# Patient Record
Sex: FEMALE | Race: WHITE | NOT HISPANIC OR LATINO | Employment: UNEMPLOYED | RURAL
[De-identification: names, ages, dates, MRNs, and addresses within clinical notes are randomized per-mention and may not be internally consistent; named-entity substitution may affect disease eponyms.]

---

## 2018-09-04 ENCOUNTER — HOSPITAL ENCOUNTER (OUTPATIENT)
Dept: PHYSICAL THERAPY | Facility: HOSPITAL | Age: 58
Setting detail: RECURRING SERIES
Discharge: HOME | End: 2018-12-31
Attending: PHYSICIAN ASSISTANT | Admitting: PHYSICIAN ASSISTANT

## 2018-12-05 ENCOUNTER — HOSPITAL ENCOUNTER (OUTPATIENT)
Dept: MRI IMAGING | Facility: HOSPITAL | Age: 58
Discharge: HOME | End: 2018-12-05
Attending: PHYSICIAN ASSISTANT | Admitting: PHYSICIAN ASSISTANT

## 2019-09-11 ENCOUNTER — TRANSCRIBE ORDERS (OUTPATIENT)
Dept: ADMINISTRATIVE | Facility: HOSPITAL | Age: 59
End: 2019-09-11

## 2019-09-11 DIAGNOSIS — M51.06 INTERVERTEBRAL LUMBAR DISC DISORDER WITH MYELOPATHY, LUMBAR REGION: Primary | ICD-10-CM

## 2019-09-17 ENCOUNTER — APPOINTMENT (OUTPATIENT)
Dept: MRI IMAGING | Facility: HOSPITAL | Age: 59
End: 2019-09-17

## 2019-09-19 ENCOUNTER — OFFICE VISIT (OUTPATIENT)
Dept: ORTHOPEDIC SURGERY | Facility: CLINIC | Age: 59
End: 2019-09-19

## 2019-09-19 VITALS
WEIGHT: 225 LBS | SYSTOLIC BLOOD PRESSURE: 132 MMHG | BODY MASS INDEX: 37.49 KG/M2 | HEART RATE: 72 BPM | HEIGHT: 65 IN | DIASTOLIC BLOOD PRESSURE: 76 MMHG

## 2019-09-19 DIAGNOSIS — M47.26 OTHER SPONDYLOSIS WITH RADICULOPATHY, LUMBAR REGION: Primary | ICD-10-CM

## 2019-09-19 DIAGNOSIS — M43.16 SPONDYLOLISTHESIS OF LUMBAR REGION: ICD-10-CM

## 2019-09-19 PROCEDURE — 99213 OFFICE O/P EST LOW 20 MIN: CPT | Performed by: PHYSICIAN ASSISTANT

## 2019-09-19 RX ORDER — GABAPENTIN 300 MG/1
CAPSULE ORAL
Refills: 3 | COMMUNITY
Start: 2019-09-04

## 2019-09-19 RX ORDER — AMLODIPINE BESYLATE 10 MG/1
10 TABLET ORAL DAILY
Refills: 0 | COMMUNITY
Start: 2019-06-17

## 2019-09-19 RX ORDER — CYCLOBENZAPRINE HCL 10 MG
10 TABLET ORAL EVERY 8 HOURS PRN
Refills: 3 | COMMUNITY
Start: 2019-09-04

## 2019-09-19 RX ORDER — PRAVASTATIN SODIUM 20 MG
20 TABLET ORAL
Refills: 2 | COMMUNITY
Start: 2019-07-27

## 2019-09-19 RX ORDER — ERGOCALCIFEROL 1.25 MG/1
50000 CAPSULE ORAL WEEKLY
Refills: 0 | COMMUNITY
Start: 2019-07-22

## 2019-09-19 RX ORDER — IBUPROFEN 800 MG/1
TABLET ORAL
Refills: 3 | COMMUNITY
Start: 2019-09-04

## 2019-09-19 NOTE — PATIENT INSTRUCTIONS
Back Exercises  The following exercises strengthen the muscles that help to support the back. They also help to keep the lower back flexible. Doing these exercises can help to prevent back pain or lessen existing pain.  If you have back pain or discomfort, try doing these exercises 2-3 times each day or as told by your health care provider. When the pain goes away, do them once each day, but increase the number of times that you repeat the steps for each exercise (do more repetitions). If you do not have back pain or discomfort, do these exercises once each day or as told by your health care provider.  Exercises  Single Knee to Chest  Repeat these steps 3-5 times for each le. Lie on your back on a firm bed or the floor with your legs extended.  2. Bring one knee to your chest. Your other leg should stay extended and in contact with the floor.  3. Hold your knee in place by grabbing your knee or thigh.  4. Pull on your knee until you feel a gentle stretch in your lower back.  5. Hold the stretch for 10-30 seconds.  6. Slowly release and straighten your leg.  Pelvic Tilt  Repeat these steps 5-10 times:  1. Lie on your back on a firm bed or the floor with your legs extended.  2. Bend your knees so they are pointing toward the ceiling and your feet are flat on the floor.  3. Tighten your lower abdominal muscles to press your lower back against the floor. This motion will tilt your pelvis so your tailbone points up toward the ceiling instead of pointing to your feet or the floor.  4. With gentle tension and even breathing, hold this position for 5-10 seconds.  Cat-Cow  Repeat these steps until your lower back becomes more flexible:  1. Get into a hands-and-knees position on a firm surface. Keep your hands under your shoulders, and keep your knees under your hips. You may place padding under your knees for comfort.  2. Let your head hang down, and point your tailbone toward the floor so your lower back becomes  rounded like the back of a cat.  3. Hold this position for 5 seconds.  4. Slowly lift your head and point your tailbone up toward the ceiling so your back forms a sagging arch like the back of a cow.  5. Hold this position for 5 seconds.    Press-Ups  Repeat these steps 5-10 times:  1. Lie on your abdomen (face-down) on the floor.  2. Place your palms near your head, about shoulder-width apart.  3. While you keep your back as relaxed as possible and keep your hips on the floor, slowly straighten your arms to raise the top half of your body and lift your shoulders. Do not use your back muscles to raise your upper torso. You may adjust the placement of your hands to make yourself more comfortable.  4. Hold this position for 5 seconds while you keep your back relaxed.  5. Slowly return to lying flat on the floor.    Bridges  Repeat these steps 10 times:  1. Lie on your back on a firm surface.  2. Bend your knees so they are pointing toward the ceiling and your feet are flat on the floor.  3. Tighten your buttocks muscles and lift your buttocks off of the floor until your waist is at almost the same height as your knees. You should feel the muscles working in your buttocks and the back of your thighs. If you do not feel these muscles, slide your feet 1-2 inches farther away from your buttocks.  4. Hold this position for 3-5 seconds.  5. Slowly lower your hips to the starting position, and allow your buttocks muscles to relax completely.  If this exercise is too easy, try doing it with your arms crossed over your chest.  Abdominal Crunches  Repeat these steps 5-10 times:  1. Lie on your back on a firm bed or the floor with your legs extended.  2. Bend your knees so they are pointing toward the ceiling and your feet are flat on the floor.  3. Cross your arms over your chest.  4. Tip your chin slightly toward your chest without bending your neck.  5. Tighten your abdominal muscles and slowly raise your trunk (torso) high  enough to lift your shoulder blades a tiny bit off of the floor. Avoid raising your torso higher than that, because it can put too much stress on your low back and it does not help to strengthen your abdominal muscles.  6. Slowly return to your starting position.  Back Lifts  Repeat these steps 5-10 times:  1. Lie on your abdomen (face-down) with your arms at your sides, and rest your forehead on the floor.  2. Tighten the muscles in your legs and your buttocks.  3. Slowly lift your chest off of the floor while you keep your hips pressed to the floor. Keep the back of your head in line with the curve in your back. Your eyes should be looking at the floor.  4. Hold this position for 3-5 seconds.  5. Slowly return to your starting position.  Contact a health care provider if:  · Your back pain or discomfort gets much worse when you do an exercise.  · Your back pain or discomfort does not lessen within 2 hours after you exercise.  If you have any of these problems, stop doing these exercises right away. Do not do them again unless your health care provider says that you can.  Get help right away if:  · You develop sudden, severe back pain. If this happens, stop doing the exercises right away. Do not do them again unless your health care provider says that you can.  This information is not intended to replace advice given to you by your health care provider. Make sure you discuss any questions you have with your health care provider.  Document Released: 01/25/2006 Document Revised: 07/31/2018 Document Reviewed: 02/11/2016  [employer] Interactive Patient Education © 2019 [employer]      Neck Exercises  Neck exercises can be important for many reasons:  · They can help you to improve and maintain flexibility in your neck. This can be especially important as you age.  · They can help to make your neck stronger. This can make movement easier.  · They can reduce or prevent neck pain.  · They may help your upper back.  Ask  your health care provider which neck exercises would be best for you.  Exercises to improve neck flexibility  Neck stretch  Repeat this exercise 3-5 times.  5. Do this exercise while standing or while sitting in a chair.  6. Place your feet flat on the floor, shoulder-width apart.  7. Slowly turn your head to the right. Turn it all the way to the right so you can look over your right shoulder. Do not tilt or tip your head.  8. Hold this position for 10-30 seconds.  9. Slowly turn your head to the left, to look over your left shoulder.  10. Hold this position for 10-30 seconds.    Neck retraction  Repeat this exercise 8-10 times. Do this 3-4 times a day or as told by your health care provider.  6. Do this exercise while standing or while sitting in a sturdy chair.  7. Look straight ahead. Do not bend your neck.  8. Use your fingers to push your chin backward. Do not bend your neck for this movement. Continue to face straight ahead. If you are doing the exercise properly, you will feel a slight sensation in your throat and a stretch at the back of your neck.  9. Hold the stretch for 1-2 seconds. Relax and repeat.  Exercises to improve neck strength  Neck press  Repeat this exercise 10 times. Do it first thing in the morning and right before bed or as told by your health care provider.  6. Lie on your back on a firm bed or on the floor with a pillow under your head.  7. Use your neck muscles to push your head down on the pillow and straighten your spine.  8. Hold the position as well as you can. Keep your head facing up and your chin tucked.  9. Slowly count to 5 while holding this position.  10. Relax for a few seconds. Then repeat.  Isometric strengthening  Do a full set of these exercises 2 times a day or as told by your health care provider.  6. Sit in a supportive chair and place your hand on your forehead.  7. Push forward with your head and neck while pushing back with your hand. Hold for 10 seconds.  8. Relax.  Then repeat the exercise 3 times.  9. Next, do the sequence again, this time putting your hand against the back of your head. Use your head and neck to push backward against the hand pressure.  10. Finally, do the same exercise on either side of your head, pushing sideways against the pressure of your hand.  Prone head lifts  Repeat this exercise 5 times. Do this 2 times a day or as told by your health care provider.  7. Lie face-down, resting on your elbows so that your chest and upper back are raised.  8. Start with your head facing downward, near your chest. Position your chin either on or near your chest.  9. Slowly lift your head upward. Lift until you are looking straight ahead. Then continue lifting your head as far back as you can stretch.  10. Hold your head up for 5 seconds. Then slowly lower it to your starting position.  Supine head lifts  Repeat this exercise 8-10 times. Do this 2 times a day or as told by your health care provider.  6. Lie on your back, bending your knees to point to the ceiling and keeping your feet flat on the floor.  7. Lift your head slowly off the floor, raising your chin toward your chest.  8. Hold for 5 seconds.  9. Relax and repeat.  Scapular retraction  Repeat this exercise 5 times. Do this 2 times a day or as told by your health care provider.  1. Stand with your arms at your sides. Look straight ahead.  2. Slowly pull both shoulders backward and downward until you feel a stretch between your shoulder blades in your upper back.  3. Hold for 10-30 seconds.  4. Relax and repeat.  Contact a health care provider if:  · Your neck pain or discomfort gets much worse when you do an exercise.  · Your neck pain or discomfort does not improve within 2 hours after you exercise.  If you have any of these problems, stop exercising right away. Do not do the exercises again unless your health care provider says that you can.  Get help right away if:  · You develop sudden, severe neck pain.  If this happens, stop exercising right away. Do not do the exercises again unless your health care provider says that you can.  This information is not intended to replace advice given to you by your health care provider. Make sure you discuss any questions you have with your health care provider.  Document Released: 11/28/2016 Document Revised: 04/23/2019 Document Reviewed: 06/27/2016  [employer] Interactive Patient Education © 2019 [employer]      Neck Exercises  Neck exercises can be important for many reasons:  · They can help you to improve and maintain flexibility in your neck. This can be especially important as you age.  · They can help to make your neck stronger. This can make movement easier.  · They can reduce or prevent neck pain.  · They may help your upper back.  Ask your health care provider which neck exercises would be best for you.  Exercises to improve neck flexibility  Neck stretch  Repeat this exercise 3-5 times.  11. Do this exercise while standing or while sitting in a chair.  12. Place your feet flat on the floor, shoulder-width apart.  13. Slowly turn your head to the right. Turn it all the way to the right so you can look over your right shoulder. Do not tilt or tip your head.  14. Hold this position for 10-30 seconds.  15. Slowly turn your head to the left, to look over your left shoulder.  16. Hold this position for 10-30 seconds.    Neck retraction  Repeat this exercise 8-10 times. Do this 3-4 times a day or as told by your health care provider.  10. Do this exercise while standing or while sitting in a sturdy chair.  11. Look straight ahead. Do not bend your neck.  12. Use your fingers to push your chin backward. Do not bend your neck for this movement. Continue to face straight ahead. If you are doing the exercise properly, you will feel a slight sensation in your throat and a stretch at the back of your neck.  13. Hold the stretch for 1-2 seconds. Relax and repeat.  Exercises to  improve neck strength  Neck press  Repeat this exercise 10 times. Do it first thing in the morning and right before bed or as told by your health care provider.  11. Lie on your back on a firm bed or on the floor with a pillow under your head.  12. Use your neck muscles to push your head down on the pillow and straighten your spine.  13. Hold the position as well as you can. Keep your head facing up and your chin tucked.  14. Slowly count to 5 while holding this position.  15. Relax for a few seconds. Then repeat.  Isometric strengthening  Do a full set of these exercises 2 times a day or as told by your health care provider.  11. Sit in a supportive chair and place your hand on your forehead.  12. Push forward with your head and neck while pushing back with your hand. Hold for 10 seconds.  13. Relax. Then repeat the exercise 3 times.  14. Next, do the sequence again, this time putting your hand against the back of your head. Use your head and neck to push backward against the hand pressure.  15. Finally, do the same exercise on either side of your head, pushing sideways against the pressure of your hand.  Prone head lifts  Repeat this exercise 5 times. Do this 2 times a day or as told by your health care provider.  11. Lie face-down, resting on your elbows so that your chest and upper back are raised.  12. Start with your head facing downward, near your chest. Position your chin either on or near your chest.  13. Slowly lift your head upward. Lift until you are looking straight ahead. Then continue lifting your head as far back as you can stretch.  14. Hold your head up for 5 seconds. Then slowly lower it to your starting position.  Supine head lifts  Repeat this exercise 8-10 times. Do this 2 times a day or as told by your health care provider.  10. Lie on your back, bending your knees to point to the ceiling and keeping your feet flat on the floor.  11. Lift your head slowly off the floor, raising your chin  toward your chest.  12. Hold for 5 seconds.  13. Relax and repeat.  Scapular retraction  Repeat this exercise 5 times. Do this 2 times a day or as told by your health care provider.  5. Stand with your arms at your sides. Look straight ahead.  6. Slowly pull both shoulders backward and downward until you feel a stretch between your shoulder blades in your upper back.  7. Hold for 10-30 seconds.  8. Relax and repeat.  Contact a health care provider if:  · Your neck pain or discomfort gets much worse when you do an exercise.  · Your neck pain or discomfort does not improve within 2 hours after you exercise.  If you have any of these problems, stop exercising right away. Do not do the exercises again unless your health care provider says that you can.  Get help right away if:  · You develop sudden, severe neck pain. If this happens, stop exercising right away. Do not do the exercises again unless your health care provider says that you can.  This information is not intended to replace advice given to you by your health care provider. Make sure you discuss any questions you have with your health care provider.  Document Released: 11/28/2016 Document Revised: 04/23/2019 Document Reviewed: 06/27/2016  [employer] Interactive Patient Education © 2019 [employer]

## 2019-09-19 NOTE — PROGRESS NOTES
Orthopedic Spine Follow Up    Referring Provider: No ref. provider found  Primary Care Provider: Lars Mendzoa APRN    Patient Name: Galilea Chapman  Patient Age: 59 y.o.  Chief Complaint: had concerns including Pain of the Left Leg.    History of Present Illness: Galilea Chapman is a 59 y.o. year old female here in  Follow up today with chief complaint of had concerns including Pain of the Left Leg..has known cervicla and lumbar ddd. She has had some improvement in her neck with injecitons, but over the last 2 years has had rle pain/numbness and weakness. Also has axial back pain. Pain changes based on activity or weather change. The numbness improlved with lumbar traction but her pain did not. Takes gabapentin, advil, flexaril withsome improvemtn.    Imaging:  My interpretation of AP and lateral lumbar x-rays continues to demonstrate significant disc height loss with degenerative listhesis of L5-S1.  Listhesis is stable on flexion extension.    Assessment:   1. Other spondylosis with radiculopathy, lumbar region    2. Spondylolisthesis of lumbar region        Plan:  L5-S1 epidural injection.  Also given her a series of isometric exercises to perform.  She fails to improve she would be a surgical candidate but would need an MRI prior to surgery.  Follow-up after epidural injection.    Subjective     Review of Systems   Constitutional: Positive for activity change. Negative for fatigue and fever.   HENT: Negative for sore throat.    Eyes: Negative for double vision.   Respiratory: Negative for choking.    Gastrointestinal: Negative for abdominal pain and constipation.   Genitourinary: Negative for dysuria.   Musculoskeletal: Positive for back pain and gait problem.   Skin: Negative for rash.   Neurological: Negative for numbness.   Hematological: Does not bruise/bleed easily.   Psychiatric/Behavioral: Positive for sleep disturbance.       The following portions of the patient's history were reviewed and updated  as appropriate: allergies, current medications, past medical history, past surgical history and problem list.    Objective     Physical Exam   Constitutional: She is oriented to person, place, and time. She appears well-developed.   HENT:   Head: Normocephalic and atraumatic.   Eyes: Conjunctivae are normal.   Neck: Normal range of motion.   Cardiovascular: Normal rate.   Pulmonary/Chest: Effort normal.   Abdominal: There is no guarding.   Musculoskeletal: She exhibits tenderness.        Right hip: Normal.        Left hip: Normal.        Lumbar back: She exhibits decreased range of motion, tenderness and pain.   Neurological: She is oriented to person, place, and time. She has normal strength.   Positive straight leg raise bilaterally   Skin: Skin is warm.   Psychiatric: Her behavior is normal.   Vitals reviewed.    Ortho Exam      1. Other spondylosis with radiculopathy, lumbar region    2. Spondylolisthesis of lumbar region         Orders Placed This Encounter   Procedures   • XR Spine Lumbar AP & Lateral With Flex & Ext     Order Specific Question:   Reason for Exam:     Answer:   back pain with radiculopathy   • Ambulatory Referral to Pain Management     Referral Priority:   Routine     Referral Type:   Pain Management     Referral Reason:   Specialty Services Required     Referred to Provider:   Binh Chawla MD     Requested Specialty:   Pain Medicine     Number of Visits Requested:   1       Procedures

## 2019-10-14 ENCOUNTER — TELEPHONE (OUTPATIENT)
Dept: ORTHOPEDIC SURGERY | Facility: CLINIC | Age: 59
End: 2019-10-14

## 2019-10-23 NOTE — TELEPHONE ENCOUNTER
To whom it may concern,    I first had an opportunity to meet and begin treating Galilea Chapman on August 21, 2018.  At that point she was having neck pain, low back pain, left shoulder pain and some numbness and tingling of her left hand which began after a fall at [facility] on July 3, 2018.  At that point she had already been to the emergency department on July 5 where an x-ray of her neck, her low back, her left shoulder, and her pelvis were performed.  She was subsequently referred to me.  Her lumbar x-ray was consistent with lumbar disc degeneration as well as her cervical x-ray.  These findings are not atypical for somebody of her age.  Furthermore these findings are frequently not symptomatic.    Patient had told me prior to the fall at [facility] she had was asymptomatic without any neck back or arm pain.  She was sent for some physical therapy and given some medication and return to the office on 9/26/2018.  At that point her symptoms have improved and we continued that treatment with a follow-up on 11/27/2018.  At that point time her low back issues had improved dramatically but her neck pain and arm symptoms had worsened so she was sent for an MRI of her neck and a test called an EMG/nerve conduction test.    She returns the office on 12/27/2018 to review the results of those tests.  Her MRI was consistent with disc degeneration at 2 levels in her neck and a left-sided disc herniation causing some left-sided spinal stenosis or narrowing of the small openings that the nerves which give Feeling into her arm.  Her EMG was consistent with carpal tunnel as well as irritation of a couple of the nerves exiting her neck that go into both of her arms.  At that point she was sent for a shot of steroids in her neck and sent to hand specialist to evaluate her for carpal tunnel.    She subsequently followed up on 9/19/2019 that point her neck pain had improved, but her low back pain with radiation to her left leg and  worsened.  She was sent for lumbar epidural injection and asked to return the office after the epidural we can obtain MRI if the pain was bad enough to merit surgical intervention.    The clinical course of this patient is common for someone with symptomatic disc degeneration.  The symptoms typically wax and wane and are frequently triggered by a traumatic event.  The changes that were found on her x-rays from her initial  emergency department visit had likely been there for many years.  According to the Ms. Chapman prior to the fall at [facility] she had no symptoms.      Objectively all I can say is I have no records of her being seen for either neck or low back pain prior to that event.  She currently has symptoms and the treatment that she has undergone has been medically necessary.    Feel free to contact me with any further questions or concerns.      OMAR Castillo

## 2022-02-14 ENCOUNTER — TRANSCRIBE ORDERS (OUTPATIENT)
Dept: PHYSICAL THERAPY | Facility: CLINIC | Age: 62
End: 2022-02-14

## 2022-02-14 DIAGNOSIS — M54.16 LUMBAR RADICULOPATHY: Primary | ICD-10-CM

## 2022-03-10 ENCOUNTER — TREATMENT (OUTPATIENT)
Dept: PHYSICAL THERAPY | Facility: CLINIC | Age: 62
End: 2022-03-10

## 2022-03-10 DIAGNOSIS — M54.42 CHRONIC BILATERAL LOW BACK PAIN WITH LEFT-SIDED SCIATICA: Primary | ICD-10-CM

## 2022-03-10 DIAGNOSIS — M51.06 LUMBAR DISC DISORDER WITH MYELOPATHY: ICD-10-CM

## 2022-03-10 DIAGNOSIS — M54.2 CERVICALGIA: ICD-10-CM

## 2022-03-10 DIAGNOSIS — M54.12 RADICULOPATHY, CERVICAL: ICD-10-CM

## 2022-03-10 DIAGNOSIS — R26.2 DIFFICULTY WALKING: ICD-10-CM

## 2022-03-10 DIAGNOSIS — G89.29 CHRONIC BILATERAL LOW BACK PAIN WITH LEFT-SIDED SCIATICA: Primary | ICD-10-CM

## 2022-03-10 PROCEDURE — 97012 MECHANICAL TRACTION THERAPY: CPT | Performed by: PHYSICAL THERAPIST

## 2022-03-10 PROCEDURE — 97014 ELECTRIC STIMULATION THERAPY: CPT | Performed by: PHYSICAL THERAPIST

## 2022-03-10 PROCEDURE — 97163 PT EVAL HIGH COMPLEX 45 MIN: CPT | Performed by: PHYSICAL THERAPIST

## 2022-03-10 NOTE — PROGRESS NOTES
"  Physical Therapy Initial Evaluation and Plan of Care    Patient: Galilea Chapman   : 1960  Diagnosis/ICD-10 Code:  Chronic bilateral low back pain with left-sided sciatica [M54.42, G89.29]  Referring practitioner: Ambrocio Maciel DO  Date of Initial Visit: 3/10/2022  Today's Date: 3/10/2022  Patient seen for 1 sessions           Subjective Questionnaire: Oswestry: 40% impairment; NDI: 48% impairment      Subjective Evaluation    History of Present Illness  Mechanism of injury: Galilea reports her neck and lower back bother her. She reports having a \"pinched nerve in the L side of neck.\" She hasn't had an epidural in months so it's painful. She has rotating epidurals, one for the neck then the next time one for the low back.  In 2018 or  she fell in [employer] and started having LLE numbness. She did PT and traction brought the feeling back. Her former pain management doc that did her epidurals left so she switched to Dr. Maciel. However, she hasn't seen him yet bc her insurance is requiring her to do PT and get an MRI first. \"If I step wrong, it will shoot up pain through my butt cheek. Sometimes I can feel the burning in the bottom of my foot. But I'm borderline diabetic so it may be that.\" She notes having trouble turning her head to the L to drive. She admits she hasn't been doing any specific exercises or stretches from last cycle of PT. LLE nerve pain runs straight down the back of the leg into the foot. If she stands on concrete for a long time she will have severe pain. Galilea admits to trouble sleeping. She reports intermittent N/T in hands and dropping items, = bilat. Galilea states she's here to fulfill her insurance requirements to get epidurals.       Patient Occupation: caregiver- sitter; is not required to do lifting, carrying, or transferring of 89 yo male. Quality of life: good (except for pain)    Pain  Pain scale: 6/10 in neck; 8/10 in low back.  Quality: burning, needle-like, radiating " "and dull ache  Alleviating factors: epidurals.    Hand dominance: left    Treatments  Previous treatment: physical therapy and injection treatment  Current treatment: physical therapy  Patient Goals  Patient goals for therapy: decreased pain  Patient goal: \"I guess to quit  hurting maybe\"           Objective        Special Questions  Patient is experiencing night pain, disturbed sleep and headaches.       Static Posture   General Observations  Asymmetrical weight bearing.     Lumbar Spine   Increased lordosis.     Postural Observations    Additional Postural Observation Details  Stands w/most wt on LLE, RLE abducted     Active Range of Motion   Cervical/Thoracic Spine   Cervical    Left rotation: 62 degrees   Right rotation: 35 degrees with pain    Additional Active Range of Motion Details  Lumbar flexion is limited by ~50%; not provocative  Extension is relieving    Strength/Myotome Testing     Left Shoulder     Planes of Motion   Flexion: 3+     Right Shoulder     Planes of Motion   Flexion: 3+     Lumbar   Left   Heel walk: normal  Toe walk: abnormal    Right   Heel walk: normal  Toe walk: normal    Left Hip   Planes of Motion   Flexion: 4    Right Hip   Planes of Motion   Flexion: 4    Additional Strength Details  Gross B shldr weakness, 3 to 3+/5 & reproduces neck pain    Tests   Cervical     Left   Positive active compression (Babb) and cervical distraction.   Negative alar ligament integrity, Spurling's sign and transverse ligament.     Right   Positive active compression (Babb) and cervical distraction.   Negative alar ligament integrity, Spurling's sign and transverse ligament.     Thoracic   Negative slump.     Left Pelvic Girdle/Sacrum   Positive: heel drop.     Right Pelvic Girdle/Sacrum   Negative: heel drop.     Additional Tests Details  Distraction relieving, compression provocative          Assessment & Plan     Assessment  Impairments: abnormal muscle firing, abnormal muscle tone, abnormal or " restricted ROM, activity intolerance, impaired physical strength, lacks appropriate home exercise program and pain with function  Functional Limitations: carrying objects, walking, uncomfortable because of pain and standing  Assessment details: Galilea presents to OP PT w/chronic cervical & lumbar pain. Cervical radicular sxs affect BUEs, lumbar radicular sxs affect LLE. Symptoms are not changing significantly and affect her tolerance to lifting, carrying items, walking & standing. She had a GLF ~2018 or 2019 which precipitated her LLE sxs. She had PT and got relief with lumbar mechanical traction and estim. She indicates she gets most relief from epidural injections for neck & low back pain & radiculopathy. PLOF: prior to GLF 3 yrs ago, no LE sxs.   The patient is an appropriate candidate for physical therapy and will benefit from skilled patient intervention in order address the above impairments/limitations.  The plan of care, goals and treatment plan were discussed with the patient and the patient is agreeable to participating in patient services.  Prognosis: good    Goals  Plan Goals: STG to be met in 4 wks  1. Pt will report sleep disturbance improved from moderate (2-3 hrs sleeplessness) to mildly (1-2 hrs) for improved QOL.  2. Pt will report no greater than 6/10 average lbp in 4 wks for improved QOL.  3. Pt will demonstrate at least 45 deg L cervical rotation to facilitate safe driving.     LTG to be met in 12 wks  1. Pt will report inc'd tolerance to walking at least 45 min for general fitness by DC  2. Pt will report at least 25% improvement in neck pain for improved QOL.  3. Pt will report no greater than 4/10 intermittent lbp for improved QOL.  4. Pt will be safe, independent, and compliant with HEP for optimized recovery by DC.  5. Pt will report no greater than 25% impairment on MARCELA (initially 40%) to reflect improved functional mobility & tolerance  6. Pt will report no greater than 30% impairment on  NDI (initially 48%) to reflect improved functional mobility & tolerance.   7. Pt will demonstrate at least 60 deg L cervical rotation to facilitate safe driving.     Plan  Therapy options: will be seen for skilled therapy services  Planned modality interventions: dry needling, cryotherapy, high voltage pulsed current (pain management), TENS and ultrasound  Planned therapy interventions: abdominal trunk stabilization, balance/weight-bearing training, body mechanics training, flexibility, functional ROM exercises, gait training, home exercise program, IADL retraining, manual therapy, motor coordination training, neuromuscular re-education, postural training, soft tissue mobilization, spinal/joint mobilization, strengthening, stretching and therapeutic activities  Frequency: 2x week  Duration in weeks: 12  Treatment plan discussed with: patient        Timed:         Manual Therapy:         mins  46699;     Therapeutic Exercise:         mins  92139;     Neuromuscular Angelo:        mins  10768;    Therapeutic Activity:          mins  68935;     Gait Training:           mins  23055;     Ultrasound:          mins  65537;    Self-care  ____ mins 36915    Un-Timed:  Electrical Stimulation:    15     mins  91723 (MC );  Dry Needling          mins self-pay 20561  Traction     15   mins 82739  Low Eval          Mins  60409  Mod Eval          Mins  59421  High Eval                    30 min  Canal repositioning _____ mins  19031        Timed Treatment:   0   mins   Total Treatment:     60   mins    PT SIGNATURE: Nereyda Lynch PT, DPT, cert. DN  [place] license # [id number]  Electronically signed by Nereyda Lynch PT, 03/10/22, 10:55 AM EST    Initial Certification  Certification Period: 3/10/2022 through 6/7/2022  I certify that the therapy services are furnished while this patient is under my care.  The services outlined above are required by this patient, and will be reviewed every 90 days.     PHYSICIAN: Janell  Ambrocio DOMINGUEZ DO    NPI: [id number]                                       DATE: ______________________________________________________________________________________________     Please sign and return via fax to [phone]. Thank you, [facility].

## 2022-03-18 ENCOUNTER — TREATMENT (OUTPATIENT)
Dept: PHYSICAL THERAPY | Facility: CLINIC | Age: 62
End: 2022-03-18

## 2022-03-18 DIAGNOSIS — M54.12 RADICULOPATHY, CERVICAL: ICD-10-CM

## 2022-03-18 DIAGNOSIS — M54.2 CERVICALGIA: ICD-10-CM

## 2022-03-18 DIAGNOSIS — R26.2 DIFFICULTY WALKING: ICD-10-CM

## 2022-03-18 DIAGNOSIS — G89.29 CHRONIC BILATERAL LOW BACK PAIN WITH LEFT-SIDED SCIATICA: Primary | ICD-10-CM

## 2022-03-18 DIAGNOSIS — M51.06 LUMBAR DISC DISORDER WITH MYELOPATHY: ICD-10-CM

## 2022-03-18 DIAGNOSIS — M54.42 CHRONIC BILATERAL LOW BACK PAIN WITH LEFT-SIDED SCIATICA: Primary | ICD-10-CM

## 2022-03-18 PROCEDURE — 97014 ELECTRIC STIMULATION THERAPY: CPT | Performed by: PHYSICAL THERAPIST

## 2022-03-18 PROCEDURE — 97112 NEUROMUSCULAR REEDUCATION: CPT | Performed by: PHYSICAL THERAPIST

## 2022-03-18 PROCEDURE — 97012 MECHANICAL TRACTION THERAPY: CPT | Performed by: PHYSICAL THERAPIST

## 2022-03-18 PROCEDURE — 97110 THERAPEUTIC EXERCISES: CPT | Performed by: PHYSICAL THERAPIST

## 2022-03-18 NOTE — PROGRESS NOTES
Physical Therapy Daily Progress Note      Patient: Galilea Chapman   : 1960  Diagnosis/ICD-10 Code:  Chronic bilateral low back pain with left-sided sciatica [M54.42, G89.29]   Problems Addressed this Visit    None     Visit Diagnoses     Chronic bilateral low back pain with left-sided sciatica    -  Primary    Lumbar disc disorder with myelopathy        Radiculopathy, cervical        Cervicalgia        Difficulty walking          Diagnoses       Codes Comments    Chronic bilateral low back pain with left-sided sciatica    -  Primary ICD-10-CM: M54.42, G89.29  ICD-9-CM: 724.2, 724.3, 338.29     Lumbar disc disorder with myelopathy     ICD-10-CM: M51.06  ICD-9-CM: 722.73     Radiculopathy, cervical     ICD-10-CM: M54.12  ICD-9-CM: 723.4     Cervicalgia     ICD-10-CM: M54.2  ICD-9-CM: 723.1     Difficulty walking     ICD-10-CM: R26.2  ICD-9-CM: 719.7         Referring practitioner: Ambrocio Maciel DO  Date of Initial Visit: Type: THERAPY  Noted: 3/10/2022  Today's Date: 3/18/2022    VISIT#: 2    Subjective   Galilea reports she's been sore today, a little more than usual bc of the weather. She has difficulty getting up/down stairs, has to use step to pattern bc L leg feels like it might give out. She has most pain when walking and standing concrete floor.     Objective     See Exercise, Manual, and Modality Logs for complete treatment.     Assessment/Plan  Galilea reported neck soreness throughout today's session but remarks she always has this feeling, holding the back of her head helps relieve it. She plans to have epidural as soon as she can get it scheduled.   Progress strengthening /stabilization /functional activity  - cervical & lumbar radiculopathy (affecting L hip, LE)       Timed:         Manual Therapy:         mins  16557;     Therapeutic Exercise:    13     mins  67402;     Neuromuscular Angelo:    10    mins  35995;    Therapeutic Activity:          mins  86965;     Gait Training:           mins   92711;     Ultrasound:          mins  79050;    Ionto                                   mins   37117  Self Care                            mins   49667  Canalith Repos                   mins  25847    Un-Timed:  Electrical Stimulation:    15     mins  84681 ( );  Dry Needling          mins 63740/66966  Traction     15     mins 39381 (12 min tx, 3 min set up)  Low Eval          Mins  38508  Mod Eval          Mins  71831  High Eval                            Mins  45270  Re-Eval                               mins  92098    Timed Treatment:   23   mins   Total Treatment:     53   mins    Nereyda Lynch, PT, DPT, cert. DN  Physical Therapist  [place] Lic # [id number]

## 2022-03-21 ENCOUNTER — TREATMENT (OUTPATIENT)
Dept: PHYSICAL THERAPY | Facility: CLINIC | Age: 62
End: 2022-03-21

## 2022-03-21 DIAGNOSIS — M54.42 CHRONIC BILATERAL LOW BACK PAIN WITH LEFT-SIDED SCIATICA: Primary | ICD-10-CM

## 2022-03-21 DIAGNOSIS — R26.2 DIFFICULTY WALKING: ICD-10-CM

## 2022-03-21 DIAGNOSIS — G89.29 CHRONIC BILATERAL LOW BACK PAIN WITH LEFT-SIDED SCIATICA: Primary | ICD-10-CM

## 2022-03-21 DIAGNOSIS — M51.06 LUMBAR DISC DISORDER WITH MYELOPATHY: ICD-10-CM

## 2022-03-21 DIAGNOSIS — M54.12 RADICULOPATHY, CERVICAL: ICD-10-CM

## 2022-03-21 DIAGNOSIS — M54.2 CERVICALGIA: ICD-10-CM

## 2022-03-21 PROCEDURE — 97140 MANUAL THERAPY 1/> REGIONS: CPT | Performed by: PHYSICAL THERAPIST

## 2022-03-21 PROCEDURE — 97012 MECHANICAL TRACTION THERAPY: CPT | Performed by: PHYSICAL THERAPIST

## 2022-03-21 PROCEDURE — 97110 THERAPEUTIC EXERCISES: CPT | Performed by: PHYSICAL THERAPIST

## 2022-03-21 PROCEDURE — 97014 ELECTRIC STIMULATION THERAPY: CPT | Performed by: PHYSICAL THERAPIST

## 2022-03-21 NOTE — PROGRESS NOTES
"     Physical Therapy Daily Progress Note      Patient: Galilea Chapman   : 1960  Diagnosis/ICD-10 Code:  Chronic bilateral low back pain with left-sided sciatica [M54.42, G89.29]  Referring practitioner: Ambrocio Maciel DO  Date of Initial Visit: Type: THERAPY  Noted: 3/10/2022  Today's Date: 3/21/2022  Patient seen for 3 sessions         Galilea Chapman reports: she continues to have neck pain and back pain with symptoms into L LE. Pt. States last visit she felt great during exercise and even felt fine the rest of the day but had soreness the following day making her legs feel like \"jelly\". Pt. States she is doing her towel stretches for her neck at home and they ar going good.    Objective   See Exercise, Manual, and Modality Logs for complete treatment.     Assessment/Plan   Pt. Has fair tolerance to all stretch and exercise this date however does struggle to complete some exercises due to poor form and understanding. Pt. responds firt to cueing and demonstration and improves as she practices. Pt. Completes new exercises noting some strain throughout but no severe pain.    Progress strengthening /stabilization /functional activity           Timed:         Manual Therapy:    15     mins  49906;     Therapeutic Exercise:    15     mins  80713;        Un-Timed:  Electrical Stimulation:    15     mins  63165 ( );  Traction     15     mins 27849;      Timed Treatment:   45   mins   Total Treatment:     60   mins    Yohana Haas PTA  Physical Therapist Assistant License #[id number]      "

## 2022-03-24 ENCOUNTER — TREATMENT (OUTPATIENT)
Dept: PHYSICAL THERAPY | Facility: CLINIC | Age: 62
End: 2022-03-24

## 2022-03-24 DIAGNOSIS — M54.2 CERVICALGIA: ICD-10-CM

## 2022-03-24 DIAGNOSIS — M51.06 LUMBAR DISC DISORDER WITH MYELOPATHY: ICD-10-CM

## 2022-03-24 DIAGNOSIS — M54.12 RADICULOPATHY, CERVICAL: ICD-10-CM

## 2022-03-24 DIAGNOSIS — G89.29 CHRONIC BILATERAL LOW BACK PAIN WITH LEFT-SIDED SCIATICA: Primary | ICD-10-CM

## 2022-03-24 DIAGNOSIS — R26.2 DIFFICULTY WALKING: ICD-10-CM

## 2022-03-24 DIAGNOSIS — M54.42 CHRONIC BILATERAL LOW BACK PAIN WITH LEFT-SIDED SCIATICA: Primary | ICD-10-CM

## 2022-03-24 PROCEDURE — 97014 ELECTRIC STIMULATION THERAPY: CPT | Performed by: PHYSICAL THERAPIST

## 2022-03-24 PROCEDURE — 97012 MECHANICAL TRACTION THERAPY: CPT | Performed by: PHYSICAL THERAPIST

## 2022-03-24 PROCEDURE — 97110 THERAPEUTIC EXERCISES: CPT | Performed by: PHYSICAL THERAPIST

## 2022-03-24 NOTE — PROGRESS NOTES
Physical Therapy Daily Progress Note      Patient: Galilea Chapman   : 1960  Diagnosis/ICD-10 Code:  Chronic bilateral low back pain with left-sided sciatica [M54.42, G89.29]   Problems Addressed this Visit    None     Visit Diagnoses     Chronic bilateral low back pain with left-sided sciatica    -  Primary    Lumbar disc disorder with myelopathy        Radiculopathy, cervical        Cervicalgia        Difficulty walking          Diagnoses       Codes Comments    Chronic bilateral low back pain with left-sided sciatica    -  Primary ICD-10-CM: M54.42, G89.29  ICD-9-CM: 724.2, 724.3, 338.29     Lumbar disc disorder with myelopathy     ICD-10-CM: M51.06  ICD-9-CM: 722.73     Radiculopathy, cervical     ICD-10-CM: M54.12  ICD-9-CM: 723.4     Cervicalgia     ICD-10-CM: M54.2  ICD-9-CM: 723.1     Difficulty walking     ICD-10-CM: R26.2  ICD-9-CM: 719.7         Referring practitioner: Ambrocio Maciel DO  Date of Initial Visit: Type: THERAPY  Noted: 3/10/2022  Today's Date: 3/24/2022    VISIT#: 4    Subjective   Galilea reports L shoulder pain today from GLF. Otherwise, she reports she's been feeling better the last few days than she has in a while.     Objective     See Exercise, Manual, and Modality Logs for complete treatment.     Assessment/Plan  Galilea is reporting dec'd pain in the last week. She cont to benefit from combo SHERIE, NMR, lumbar traction followed by estim & MHP to lumbar spine.   Progress strengthening /stabilization /functional activity         Timed:         Manual Therapy:         mins  94882;     Therapeutic Exercise:    30     mins  91513;     Neuromuscular Angelo:        mins  27619;    Therapeutic Activity:          mins  41553;     Gait Training:           mins  48880;     Ultrasound:          mins  00202;    Ionto                                   mins   62699  Self Care                            mins   49441  Canalith Repos                   mins  53450    Un-Timed:  Electrical  Stimulation:    15     mins  49047 ( );  Dry Needling          mins 05489/83652  Traction       15   mins 76440  Low Eval          Mins  64547  Mod Eval          Mins  23212  High Eval                            Mins  27980  Re-Eval                               mins  76852    Timed Treatment:   30   mins   Total Treatment:     60   mins    Nereyda Lynch, PT, DPT, cert. DN  Physical Therapist  [place] Lic # [id number]

## 2022-03-31 ENCOUNTER — TREATMENT (OUTPATIENT)
Dept: PHYSICAL THERAPY | Facility: CLINIC | Age: 62
End: 2022-03-31

## 2022-03-31 DIAGNOSIS — M54.42 CHRONIC BILATERAL LOW BACK PAIN WITH LEFT-SIDED SCIATICA: Primary | ICD-10-CM

## 2022-03-31 DIAGNOSIS — M54.2 CERVICALGIA: ICD-10-CM

## 2022-03-31 DIAGNOSIS — G89.29 CHRONIC BILATERAL LOW BACK PAIN WITH LEFT-SIDED SCIATICA: Primary | ICD-10-CM

## 2022-03-31 DIAGNOSIS — M54.12 RADICULOPATHY, CERVICAL: ICD-10-CM

## 2022-03-31 DIAGNOSIS — M51.06 LUMBAR DISC DISORDER WITH MYELOPATHY: ICD-10-CM

## 2022-03-31 DIAGNOSIS — R26.2 DIFFICULTY WALKING: ICD-10-CM

## 2022-03-31 PROCEDURE — 97530 THERAPEUTIC ACTIVITIES: CPT | Performed by: PHYSICAL THERAPIST

## 2022-03-31 PROCEDURE — 97110 THERAPEUTIC EXERCISES: CPT | Performed by: PHYSICAL THERAPIST

## 2022-03-31 PROCEDURE — 97012 MECHANICAL TRACTION THERAPY: CPT | Performed by: PHYSICAL THERAPIST

## 2022-03-31 PROCEDURE — 97014 ELECTRIC STIMULATION THERAPY: CPT | Performed by: PHYSICAL THERAPIST

## 2022-03-31 PROCEDURE — 97140 MANUAL THERAPY 1/> REGIONS: CPT | Performed by: PHYSICAL THERAPIST

## 2022-03-31 NOTE — PROGRESS NOTES
Physical Therapy Daily Progress Note      Patient: Galilea Chapman   : 1960  Diagnosis/ICD-10 Code:  Chronic bilateral low back pain with left-sided sciatica [M54.42, G89.29]  Referring practitioner: Ambrocio Maciel DO  Date of Initial Visit: Type: THERAPY  Noted: 3/10/2022  Today's Date: 3/31/2022  Patient seen for 5 sessions         Galilea Chapman reports: L neck, R knee, L hip, and low back pain this treatment session. Pt. Reports she feels very sore all over today due to the changing weather recently. Pt. States this date she would like to get her endurance up to be able to navigate stairs without fatigue or knee pain.     Objective   See Exercise, Manual, and Modality Logs for complete treatment.       Assessment/Plan   During manual interventions, Pt. Tolerated LAD and manual stretches well with reported mild relief of symptoms from Pt. Pt. Performed cervical snatches exercise this date to address recent neck pain symptoms with good tolerance and reported relief of symptoms from Pt. Pt. Reported increased lbp while performing hooklying LTR, and PB was added to reduce lbp with relief of symptoms. Pt. Was educated on benefits of abdominal engagement and PPT while performing therapeutic exercises to improve lbp. Pt. Required occasional v/c on abdominal engagement while performing hip ab/add and LTR with PB. Resistance on mid rows exercise was decreased to 4 kg to improve form and technique.     Progress strengthening /stabilization /functional activity           Timed:         Manual Therapy:    15     mins  03906;     Therapeutic Exercise:    15     mins  60250;     Therapeutic Activity:     10     mins  87686;       Un-Timed:  Electrical Stimulation:    15     mins  20861 ( );  Traction     15     mins 98220;      Timed Treatment:   40   mins   Total Treatment:     70   mins    Yohana Haas PTA  Physical Therapist Assistant License #[id number]

## 2022-04-06 ENCOUNTER — TREATMENT (OUTPATIENT)
Dept: PHYSICAL THERAPY | Facility: CLINIC | Age: 62
End: 2022-04-06

## 2022-04-06 DIAGNOSIS — M54.12 RADICULOPATHY, CERVICAL: ICD-10-CM

## 2022-04-06 DIAGNOSIS — R26.2 DIFFICULTY WALKING: ICD-10-CM

## 2022-04-06 DIAGNOSIS — M51.06 LUMBAR DISC DISORDER WITH MYELOPATHY: ICD-10-CM

## 2022-04-06 DIAGNOSIS — M54.42 CHRONIC BILATERAL LOW BACK PAIN WITH LEFT-SIDED SCIATICA: Primary | ICD-10-CM

## 2022-04-06 DIAGNOSIS — M54.2 CERVICALGIA: ICD-10-CM

## 2022-04-06 DIAGNOSIS — G89.29 CHRONIC BILATERAL LOW BACK PAIN WITH LEFT-SIDED SCIATICA: Primary | ICD-10-CM

## 2022-04-06 PROCEDURE — 97530 THERAPEUTIC ACTIVITIES: CPT | Performed by: PHYSICAL THERAPIST

## 2022-04-06 PROCEDURE — 97110 THERAPEUTIC EXERCISES: CPT | Performed by: PHYSICAL THERAPIST

## 2022-04-06 PROCEDURE — 97014 ELECTRIC STIMULATION THERAPY: CPT | Performed by: PHYSICAL THERAPIST

## 2022-04-06 PROCEDURE — 97140 MANUAL THERAPY 1/> REGIONS: CPT | Performed by: PHYSICAL THERAPIST

## 2022-04-06 PROCEDURE — 97012 MECHANICAL TRACTION THERAPY: CPT | Performed by: PHYSICAL THERAPIST

## 2022-04-06 NOTE — PROGRESS NOTES
Physical Therapy Daily Progress Note      Patient: Galilea Chapman   : 1960  Diagnosis/ICD-10 Code:  Chronic bilateral low back pain with left-sided sciatica [M54.42, G89.29]  Referring practitioner: Ambrocio Maciel DO  Date of Initial Visit: Type: THERAPY  Noted: 3/10/2022  Today's Date: 2022  Patient seen for 6 sessions         Galilea Chapman reports: she is feeling sore today. Pt. Reports she was sore after the previous treatment session, but soreness subsided after a day or two. Pt. States she has increased pain in L hip and R knee. Pt. States her L neck pain has improved some. Pt. States the soreness is likely due to the rainy weather today.     Objective   See Exercise, Manual, and Modality Logs for complete treatment.       Assessment/Plan   Pt. Tolerated manual interventions well this treatment date with presentation of mild increased L hip pain during SKTC manual stretches. Pt. Describes pain as sharp in L gluteal area. Pt. Also presented with increased R UT and cervical paraspinal tightness during manual interventions. Pt. Performed therapeutic activities and exercises well with v/c and t/c required for proper form and technique during exercises. Pt. Tolerated mechanical traction and E-Stim well this treatment session.     Plan Goals: STG to be met in 4 wks  1. Pt will report sleep disturbance improved from moderate (2-3 hrs sleeplessness) to mildly (1-2 hrs) for improved QOL.  2. Pt will report no greater than 6/10 average lbp in 4 wks for improved QOL.  3. Pt will demonstrate at least 45 deg L cervical rotation to facilitate safe driving.     LTG to be met in 12 wks  1. Pt will report inc'd tolerance to walking at least 45 min for general fitness by DC  2. Pt will report at least 25% improvement in neck pain for improved QOL.  3. Pt will report no greater than 4/10 intermittent lbp for improved QOL.  4. Pt will be safe, independent, and compliant with HEP for optimized recovery by  DC.  5. Pt will report no greater than 25% impairment on MARCELA (initially 40%) to reflect improved functional mobility & tolerance  6. Pt will report no greater than 30% impairment on NDI (initially 48%) to reflect improved functional mobility & tolerance.   7. Pt will demonstrate at least 60 deg L cervical rotation to facilitate safe driving.     Progress strengthening /stabilization /functional activity           Timed:         Manual Therapy:    15     mins  93069;     Therapeutic Exercise:    15     mins  83592;      Therapeutic Activity:     10     mins  98855;       Un-Timed:  Electrical Stimulation:    15     mins  67539 ( );  Traction     15     mins 24770;      Timed Treatment:   40   mins   Total Treatment:     55   mins    Yohana Haas PTA  Physical Therapist Assistant License #[id number]

## 2022-04-08 ENCOUNTER — TREATMENT (OUTPATIENT)
Dept: PHYSICAL THERAPY | Facility: CLINIC | Age: 62
End: 2022-04-08

## 2022-04-08 DIAGNOSIS — M51.06 LUMBAR DISC DISORDER WITH MYELOPATHY: ICD-10-CM

## 2022-04-08 DIAGNOSIS — M54.12 RADICULOPATHY, CERVICAL: ICD-10-CM

## 2022-04-08 DIAGNOSIS — R26.2 DIFFICULTY WALKING: ICD-10-CM

## 2022-04-08 DIAGNOSIS — M54.2 CERVICALGIA: ICD-10-CM

## 2022-04-08 DIAGNOSIS — M54.42 CHRONIC BILATERAL LOW BACK PAIN WITH LEFT-SIDED SCIATICA: Primary | ICD-10-CM

## 2022-04-08 DIAGNOSIS — G89.29 CHRONIC BILATERAL LOW BACK PAIN WITH LEFT-SIDED SCIATICA: Primary | ICD-10-CM

## 2022-04-08 PROCEDURE — 97012 MECHANICAL TRACTION THERAPY: CPT | Performed by: PHYSICAL THERAPIST

## 2022-04-08 PROCEDURE — 97110 THERAPEUTIC EXERCISES: CPT | Performed by: PHYSICAL THERAPIST

## 2022-04-08 PROCEDURE — 97164 PT RE-EVAL EST PLAN CARE: CPT | Performed by: PHYSICAL THERAPIST

## 2022-04-08 PROCEDURE — 97014 ELECTRIC STIMULATION THERAPY: CPT | Performed by: PHYSICAL THERAPIST

## 2022-04-08 NOTE — PROGRESS NOTES
Re-Assessment / Re-Certification        Patient: Galilea Chapman   : 1960  Diagnosis/ICD-10 Code:  Chronic bilateral low back pain with left-sided sciatica [M54.42, G89.29]  Referring practitioner: Ambrocio Maciel DO  Date of Initial Visit: Type: THERAPY  Noted: 3/10/2022  Today's Date: 2022  Patient seen for 7 sessions      Subjective:   Galilea Chapman reports: lately her L hip and L neck and shoulder have been very sore lately. She's unsure if it's from the weather or the exercises last session. Galilea reports she usually has at least a day of relief after PT but not this past session. She states she believes her pain is getting worse bc she hasn't had her epidurals. The epidurals have been very effective in the past and she is overdue bc of insurance requirements.     Subjective Questionnaire: PT Functional Test: NDI = 34% impairment; MARCELA: 58% impairment   Clinical Progress: improved - NDI improved, MARCELA worse  Home Program Compliance: Yes  Treatment has included: therapeutic exercise, neuromuscular re-education, electrical stimulation, traction and moist heat    Subjective   Objective   Assessment/Plan   1. Pt will report sleep disturbance improved from moderate (2-3 hrs sleeplessness) to mildly (1-2 hrs) for improved QOL. - NOT MET  2. Pt will report no greater than 6/10 average lbp in 4 wks for improved QOL. - NOT MET 6/10 avg; 8/10 today  3. Pt will demonstrate at least 45 deg L cervical rotation to facilitate safe driving. - MET 47 DEG ON 22    LTG to be met in 12 wks  1. Pt will report inc'd tolerance to walking at least 45 min for general fitness by DC - NOT MET~10 min ON HARD SURFACES  2. Pt will report at least 25% improvement in neck pain for improved QOL. - NOT MET  3. Pt will report no greater than 4/10 intermittent lbp for improved QOL. - NOT MET  4. Pt will be safe, independent, and compliant with HEP for optimized recovery by DC. - MET  5. Pt will report no greater than 25%  impairment on MARCELA (initially 40%) to reflect improved functional mobility & tolerance - NOT MET  6. Pt will report no greater than 30% impairment on NDI (initially 48%) to reflect improved functional mobility & tolerance. - PARTIALLY MET  7. Pt will demonstrate at least 60 deg L cervical rotation to facilitate safe driving. - NOT MET  Progress toward previous goals: PARTIALLY MET        Recommendations: Continue as planned  Timeframe: 6 weeks  Prognosis to achieve goals: fair    PT Signature: Nereyda Lynch PT, CLT  Physical Therapist  [place] Lic # [id number]  Electronically signed by Nereyda Lynch PT, 04/08/22, 11:06 AM EDT    Certification Period: 4/25 thru 5/20  I certify that the therapy services are furnished while this patient is under my care. The services outlined above are required by this patient and will be reviewed every 90 days.    PHYSICIAN: Ambrocio Maciel, DO _____________________________________________________________  NPI: [id number]                                      DATE:    ___________________________________________      Timed:         Manual Therapy:         mins  80596;     Therapeutic Exercise:   30     mins  13938;     Neuromuscular Angelo:        mins  10183;    Therapeutic Activity:          mins  82956;     Gait Training:           mins  85576;     Ultrasound:          mins  29762;    Ionto                                   mins   56806  Self Care                            mins   41144    Un-Timed:  Electrical Stimulation:    15     mins  32026 ( );  Dry Needling          mins 92647/77231  Traction     15     mins 37505  Can Repos          mins 51110  Low Eval          Mins  10751  Mod Eval          Mins  40303  High Eval                            Mins  35893  Re-Eval                           10    mins  47826      Timed Treatment:   30   mins   Total Treatment:     70   mins

## 2024-01-19 ENCOUNTER — DOCUMENTATION (OUTPATIENT)
Dept: PHYSICAL THERAPY | Facility: CLINIC | Age: 64
End: 2024-01-19
Payer: MEDICAID

## 2024-01-19 NOTE — PROGRESS NOTES
Discharge Summary  Discharge Summary from Physical Therapy Report                               [address] [facility] [address], [address], [place]  [address]    Dates  PT visit:3/10/23-4/8  Number of Visits: 7     Discharge Status of Patient: See Progress Note dated 4/8/23  1. Pt will report sleep disturbance improved from moderate (2-3 hrs sleeplessness) to mildly (1-2 hrs) for improved QOL. - NOT MET  2. Pt will report no greater than 6/10 average lbp in 4 wks for improved QOL. - NOT MET 6/10 avg; 8/10 today  3. Pt will demonstrate at least 45 deg L cervical rotation to facilitate safe driving. - MET 47 DEG ON 4/8/22    LTG to be met in 12 wks  1. Pt will report inc'd tolerance to walking at least 45 min for general fitness by DC - NOT MET~10 min ON HARD SURFACES  2. Pt will report at least 25% improvement in neck pain for improved QOL. - NOT MET  3. Pt will report no greater than 4/10 intermittent lbp for improved QOL. - NOT MET  4. Pt will be safe, independent, and compliant with HEP for optimized recovery by DC. - MET  5. Pt will report no greater than 25% impairment on MARCELA (initially 40%) to reflect improved functional mobility & tolerance - NOT MET  6. Pt will report no greater than 30% impairment on NDI (initially 48%) to reflect improved functional mobility & tolerance. - PARTIALLY MET  7. Pt will demonstrate at least 60 deg L cervical rotation to facilitate safe driving. - NOT MET  Progress toward previous goals: PARTIALLY MET     Goals: Partially Met      Comments Pt canceled appointments and opted to self DC    Date of Discharge 1/19/24        Nereyda Lynch, PT, DPT, cert. DN  Physical Therapist  [place] Lic# [id number]